# Patient Record
Sex: FEMALE | Race: WHITE | NOT HISPANIC OR LATINO | Employment: FULL TIME
[De-identification: names, ages, dates, MRNs, and addresses within clinical notes are randomized per-mention and may not be internally consistent; named-entity substitution may affect disease eponyms.]

---

## 2017-03-02 ENCOUNTER — RX ONLY (RX ONLY)
Age: 62
End: 2017-03-02

## 2017-03-02 RX ORDER — DOXYCYCLINE 100 MG/1
ADD'L SIG ADD'L SIG TABLET, FILM COATED ORAL ADD'L SIG
Qty: 30 | Refills: 6 | Status: DISCONTINUED
Start: 2017-03-02 | End: 2019-04-01

## 2017-10-03 ENCOUNTER — SKIN CANCER EXAM (OUTPATIENT)
Dept: URBAN - METROPOLITAN AREA CLINIC 31 | Facility: CLINIC | Age: 62
Setting detail: DERMATOLOGY
End: 2017-10-03

## 2017-10-03 PROCEDURE — 11300 SHAVE SKIN LESION 0.5 CM/<: CPT

## 2017-10-03 PROCEDURE — 99214 OFFICE O/P EST MOD 30 MIN: CPT

## 2017-10-24 ENCOUNTER — RX ONLY (RX ONLY)
Age: 62
End: 2017-10-24

## 2017-10-24 RX ORDER — DOXYCYCLINE 100 MG/1
ADD'L SIG ADD'L SIG TABLET, FILM COATED ORAL ADD'L SIG
Qty: 30 | Refills: 6 | Status: DISCONTINUED
Start: 2017-10-24 | End: 2019-04-01

## 2018-06-29 ENCOUNTER — RX ONLY (RX ONLY)
Age: 63
End: 2018-06-29

## 2018-06-29 ENCOUNTER — WORRISOME GROWTH - SEE NOTE (OUTPATIENT)
Dept: URBAN - METROPOLITAN AREA CLINIC 31 | Facility: CLINIC | Age: 63
Setting detail: DERMATOLOGY
End: 2018-06-29

## 2018-06-29 PROCEDURE — 99213 OFFICE O/P EST LOW 20 MIN: CPT

## 2018-06-29 PROCEDURE — 11900 INJECT SKIN LESIONS </W 7: CPT

## 2018-06-29 RX ORDER — CLOBETASOL PROPIONATE 0.5 MG/G
1 APPLICATION CREAM TOPICAL BID
Qty: 60 | Refills: 3 | Status: DISCONTINUED
Start: 2018-06-29 | End: 2019-11-21

## 2018-06-29 RX ORDER — DOXYCYCLINE HYCLATE 200 MG/1
1 TABLET TABLET, DELAYED RELEASE ORAL DAILY
Qty: 10 | Refills: 0 | Status: DISCONTINUED
Start: 2018-06-29 | End: 2018-06-29

## 2018-06-29 RX ORDER — DOXYCYCLINE HYCLATE 100 MG/1
1 CAPSULE CAPSULE, GELATIN COATED ORAL DAILY
Qty: 90 | Refills: 1 | Status: DISCONTINUED
Start: 2018-06-29 | End: 2019-04-01

## 2018-06-29 RX ORDER — DOXYCYCLINE 100 MG/1
1 TABLET TABLET, FILM COATED ORAL BID
Qty: 20 | Refills: 0 | Status: DISCONTINUED
Start: 2018-06-29 | End: 2019-04-01

## 2018-07-02 ENCOUNTER — RX ONLY (RX ONLY)
Age: 63
End: 2018-07-02

## 2018-07-02 RX ORDER — DOXYCYCLINE 100 MG/1
ADD'L SIG ADD'L SIG TABLET, FILM COATED ORAL ADD'L SIG
Qty: 30 | Refills: 6 | Status: DISCONTINUED
Start: 2018-07-02 | End: 2019-04-01

## 2018-10-02 ENCOUNTER — RX ONLY (RX ONLY)
Age: 63
End: 2018-10-02

## 2018-10-02 RX ORDER — DOXYCYCLINE HYCLATE 100 MG/1
ADD'L SIG ADD'L SIG CAPSULE, GELATIN COATED ORAL ADD'L SIG
Qty: 100 | Refills: 1 | Status: DISCONTINUED
Start: 2018-10-02 | End: 2019-11-21

## 2019-01-11 ENCOUNTER — RX ONLY (RX ONLY)
Age: 64
End: 2019-01-11

## 2019-01-11 RX ORDER — CLOBETASOL PROPIONATE 0.5 MG/G
1 APPLICATION CREAM TOPICAL BID
Qty: 30 | Refills: 1 | Status: DISCONTINUED
Start: 2019-01-11 | End: 2019-01-16

## 2019-01-16 ENCOUNTER — RX ONLY (RX ONLY)
Age: 64
End: 2019-01-16

## 2019-01-16 RX ORDER — CLOBETASOL PROPIONATE 0.5 MG/G
1 APPLICATION CREAM TOPICAL BID
Qty: 30 | Refills: 1 | Status: DISCONTINUED
Start: 2019-01-16 | End: 2019-11-21

## 2019-01-31 ENCOUNTER — SEE NOTE (OUTPATIENT)
Dept: URBAN - METROPOLITAN AREA CLINIC 31 | Facility: CLINIC | Age: 64
Setting detail: DERMATOLOGY
End: 2019-01-31

## 2019-01-31 DIAGNOSIS — Z41.9 ENCOUNTER FOR PROCEDURE FOR PURPOSES OTHER THAN REMEDYING HEALTH STATE, UNSPECIFIED: ICD-10-CM

## 2019-01-31 PROBLEM — L57.0 ACTINIC KERATOSIS: Status: RESOLVED | Noted: 2019-01-31

## 2019-01-31 PROCEDURE — 17000 DESTRUCT PREMALG LESION: CPT

## 2019-01-31 PROCEDURE — 99213 OFFICE O/P EST LOW 20 MIN: CPT

## 2019-01-31 PROCEDURE — 17003 DESTRUCT PREMALG LES 2-14: CPT

## 2019-04-01 ENCOUNTER — SKIN CANCER EXAM (OUTPATIENT)
Dept: URBAN - METROPOLITAN AREA CLINIC 31 | Facility: CLINIC | Age: 64
Setting detail: DERMATOLOGY
End: 2019-04-01

## 2019-04-01 DIAGNOSIS — Z41.9 ENCOUNTER FOR PROCEDURE FOR PURPOSES OTHER THAN REMEDYING HEALTH STATE, UNSPECIFIED: ICD-10-CM

## 2019-04-01 PROCEDURE — 99214 OFFICE O/P EST MOD 30 MIN: CPT

## 2019-04-02 ENCOUNTER — RX ONLY (RX ONLY)
Age: 64
End: 2019-04-02

## 2019-04-02 RX ORDER — DOXYCYCLINE HYCLATE 100 MG/1
ADD'L SIG ADD'L SIG CAPSULE, GELATIN COATED ORAL ADD'L SIG
Qty: 90 | Refills: 0 | Status: DISCONTINUED
Start: 2019-04-02 | End: 2019-11-21

## 2019-06-13 ENCOUNTER — WORRISOME GROWTH - SEE NOTE (OUTPATIENT)
Dept: URBAN - METROPOLITAN AREA CLINIC 31 | Facility: CLINIC | Age: 64
Setting detail: DERMATOLOGY
End: 2019-06-13

## 2019-06-13 DIAGNOSIS — L82.0 INFLAMED SEBORRHEIC KERATOSIS: ICD-10-CM

## 2019-06-13 PROBLEM — L57.0 ACTINIC KERATOSIS: Status: RESOLVED | Noted: 2019-06-13

## 2019-06-13 PROCEDURE — 17003 DESTRUCT PREMALG LES 2-14: CPT

## 2019-06-13 PROCEDURE — 17000 DESTRUCT PREMALG LESION: CPT

## 2019-06-13 RX ORDER — EMOLLIENT COMBINATION NO.10
1 APPLICATION EMULSION (GRAM) TOPICAL BID
Qty: 45 | Refills: 6 | Status: DISCONTINUED
Start: 2019-06-13 | End: 2020-06-22

## 2019-11-21 ENCOUNTER — WORRISOME GROWTH - SEE NOTE (OUTPATIENT)
Dept: URBAN - METROPOLITAN AREA CLINIC 31 | Facility: CLINIC | Age: 64
Setting detail: DERMATOLOGY
End: 2019-11-21

## 2019-11-21 DIAGNOSIS — L57.0 ACTINIC KERATOSIS: ICD-10-CM

## 2019-11-21 PROBLEM — L85.3 XEROSIS CUTIS: Status: RESOLVED | Noted: 2019-11-21

## 2019-11-21 PROBLEM — L85.3 XEROSIS CUTIS: Status: ACTIVE | Noted: 2019-11-21

## 2019-11-21 PROCEDURE — 17000 DESTRUCT PREMALG LESION: CPT

## 2019-11-21 PROCEDURE — 99212 OFFICE O/P EST SF 10 MIN: CPT

## 2020-03-03 ENCOUNTER — RX ONLY (RX ONLY)
Age: 65
End: 2020-03-03

## 2020-03-03 RX ORDER — DOXYCYCLINE HYCLATE 100 MG/1
ADD'L SIG ADD'L SIG CAPSULE, GELATIN COATED ORAL ADD'L SIG
Qty: 90 | Refills: 0 | Status: DISCONTINUED
Start: 2020-03-03 | End: 2020-05-29

## 2020-05-29 ENCOUNTER — RX ONLY (RX ONLY)
Age: 65
End: 2020-05-29

## 2020-05-29 RX ORDER — DOXYCYCLINE HYCLATE 100 MG/1
ADD'L SIG ADD'L SIG CAPSULE, GELATIN COATED ORAL ADD'L SIG
Qty: 90 | Refills: 0 | Status: DISCONTINUED
Start: 2020-05-29 | End: 2020-05-29

## 2020-05-29 RX ORDER — CLOBETASOL PROPIONATE 0.5 MG/G
1 APPLICATION CREAM TOPICAL BID
Qty: 30 | Refills: 1 | Status: DISCONTINUED
Start: 2020-05-29 | End: 2020-07-15

## 2020-05-29 RX ORDER — DOXYCYCLINE HYCLATE 100 MG/1
ADD'L SIG ADD'L SIG CAPSULE, GELATIN COATED ORAL ADD'L SIG
Qty: 90 | Refills: 0 | Status: DISCONTINUED
Start: 2020-05-29 | End: 2020-08-04

## 2020-05-29 RX ORDER — CLOBETASOL PROPIONATE 0.5 MG/G
1 APPLICATION CREAM TOPICAL BID
Qty: 30 | Refills: 1 | Status: DISCONTINUED
Start: 2020-05-29 | End: 2020-05-29

## 2020-06-01 ENCOUNTER — RX ONLY (RX ONLY)
Age: 65
End: 2020-06-01

## 2020-06-01 RX ORDER — DOXYCYCLINE HYCLATE 100 MG/1
ADD'L SIG ADD'L SIG CAPSULE, GELATIN COATED ORAL ADD'L SIG
Qty: 90 | Refills: 0
Start: 2020-06-01

## 2020-06-22 ENCOUNTER — RX ONLY (RX ONLY)
Age: 65
End: 2020-06-22

## 2020-06-22 RX ORDER — EMOLLIENT COMBINATION NO.10
1 APPLICATION EMULSION (GRAM) TOPICAL BID
Qty: 45 | Refills: 6
Start: 2020-06-22

## 2020-07-15 ENCOUNTER — RX ONLY (RX ONLY)
Age: 65
End: 2020-07-15

## 2020-07-15 ENCOUNTER — OTHER - (OUTPATIENT)
Dept: URBAN - METROPOLITAN AREA CLINIC 32 | Facility: CLINIC | Age: 65
Setting detail: DERMATOLOGY
End: 2020-07-15

## 2020-07-15 DIAGNOSIS — D22.5 MELANOCYTIC NEVI OF TRUNK: ICD-10-CM

## 2020-07-15 PROCEDURE — 99213 OFFICE O/P EST LOW 20 MIN: CPT

## 2020-07-15 RX ORDER — CLOBETASOL PROPIONATE 0.5 MG/G
1 APPLICATION CREAM TOPICAL BID
Qty: 30 | Refills: 1
Start: 2020-07-15

## 2020-07-15 RX ORDER — FAMCICLOVIR 500 MG/1
1 TABLET TABLET, FILM COATED ORAL TID
Qty: 21 | Refills: 0 | Status: DISCONTINUED
Start: 2020-07-15 | End: 2020-07-17

## 2020-07-15 RX ORDER — DESONIDE 0.5 MG/G
1 APPLICATION CREAM TOPICAL BID
Qty: 60 | Refills: 0
Start: 2020-07-15

## 2020-07-17 ENCOUNTER — RX ONLY (RX ONLY)
Age: 65
End: 2020-07-17

## 2020-07-17 RX ORDER — FAMCICLOVIR 500 MG/1
1 TABLET TABLET, FILM COATED ORAL TID
Qty: 21 | Refills: 0
Start: 2020-07-17

## 2020-08-04 ENCOUNTER — RX ONLY (RX ONLY)
Age: 65
End: 2020-08-04

## 2020-08-04 RX ORDER — DOXYCYCLINE HYCLATE 100 MG/1
ADD'L SIG CAPSULE CAPSULE, GELATIN COATED ORAL ADD'L SIG
Qty: 90 | Refills: 0 | Status: DISCONTINUED
Start: 2020-08-04 | End: 2020-11-03

## 2020-08-18 ENCOUNTER — SKIN CANCER EXAM (OUTPATIENT)
Dept: URBAN - METROPOLITAN AREA CLINIC 31 | Facility: CLINIC | Age: 65
Setting detail: DERMATOLOGY
End: 2020-08-18

## 2020-08-18 DIAGNOSIS — C44.612 BASAL CELL CARCINOMA OF SKIN OF RIGHT UPPER LIMB, INCLUDING SHOULDER: ICD-10-CM

## 2020-08-18 PROBLEM — L82.1 OTHER SEBORRHEIC KERATOSIS: Status: RESOLVED | Noted: 2020-08-18

## 2020-08-18 PROBLEM — L82.1 OTHER SEBORRHEIC KERATOSIS: Status: ACTIVE | Noted: 2020-08-18

## 2020-08-18 PROCEDURE — 99214 OFFICE O/P EST MOD 30 MIN: CPT

## 2020-10-23 ENCOUNTER — RX ONLY (RX ONLY)
Age: 65
End: 2020-10-23

## 2020-10-23 ENCOUNTER — TELEHEALTH (OUTPATIENT)
Dept: URBAN - METROPOLITAN AREA CLINIC 36 | Facility: CLINIC | Age: 65
Setting detail: DERMATOLOGY
End: 2020-10-23

## 2020-10-23 DIAGNOSIS — Z41.9 ENCOUNTER FOR PROCEDURE FOR PURPOSES OTHER THAN REMEDYING HEALTH STATE, UNSPECIFIED: ICD-10-CM

## 2020-10-23 PROCEDURE — 99213 OFFICE O/P EST LOW 20 MIN: CPT

## 2020-10-23 RX ORDER — CLINDAMYCIN PHOSPHATE AND BENZOYL PEROXIDE 10; 50 MG/G; MG/G
1 APPLICATION GEL TOPICAL BID
Qty: 45 | Refills: 0
Start: 2020-10-23

## 2020-11-03 ENCOUNTER — RX ONLY (RX ONLY)
Age: 65
End: 2020-11-03

## 2020-11-03 RX ORDER — DOXYCYCLINE HYCLATE 100 MG/1
ADD'L SIG CAPSULE CAPSULE, GELATIN COATED ORAL ADD'L SIG
Qty: 90 | Refills: 0
Start: 2020-11-03

## 2021-02-01 ENCOUNTER — TELEHEALTH (OUTPATIENT)
Dept: URBAN - METROPOLITAN AREA CLINIC 31 | Facility: CLINIC | Age: 66
Setting detail: DERMATOLOGY
End: 2021-02-01

## 2021-02-01 ENCOUNTER — RX ONLY (RX ONLY)
Age: 66
End: 2021-02-01

## 2021-02-01 DIAGNOSIS — L82.0 INFLAMED SEBORRHEIC KERATOSIS: ICD-10-CM

## 2021-02-01 PROBLEM — L60.9 NAIL DISORDER, UNSPECIFIED: Status: ACTIVE | Noted: 2021-02-01

## 2021-02-01 PROBLEM — L60.9 NAIL DISORDER, UNSPECIFIED: Status: RESOLVED | Noted: 2021-02-01

## 2021-02-01 PROCEDURE — 99213 OFFICE O/P EST LOW 20 MIN: CPT

## 2021-02-01 RX ORDER — CICLOPIROX 80 MG/ML
1 ML SOLUTION TOPICAL BID
Qty: 6.6 | Refills: 2
Start: 2021-02-01

## 2021-02-02 PROBLEM — L603 703.8: Status: ACTIVE | Noted: 2021-02-02

## 2021-02-02 PROBLEM — L604 703.8: Status: ACTIVE | Noted: 2021-02-02

## 2021-02-02 PROBLEM — L602 703.8: Status: ACTIVE | Noted: 2021-02-02

## 2021-02-02 PROBLEM — L608 703.8: Status: ACTIVE | Noted: 2021-02-02

## 2021-02-02 PROBLEM — L601 703.8: Status: ACTIVE | Noted: 2021-02-02

## 2021-05-05 ENCOUNTER — RX ONLY (RX ONLY)
Age: 66
End: 2021-05-05

## 2021-05-05 RX ORDER — DOXYCYCLINE 100 MG/1
1 TABLET TABLET, FILM COATED ORAL ONCE A DAY
Qty: 90 | Refills: 1
Start: 2021-05-05

## 2021-06-22 ENCOUNTER — RX ONLY (RX ONLY)
Age: 66
End: 2021-06-22

## 2021-06-22 ENCOUNTER — RASH (OUTPATIENT)
Dept: URBAN - METROPOLITAN AREA CLINIC 31 | Facility: CLINIC | Age: 66
Setting detail: DERMATOLOGY
End: 2021-06-22

## 2021-06-22 DIAGNOSIS — D22.5 MELANOCYTIC NEVI OF TRUNK: ICD-10-CM

## 2021-06-22 PROBLEM — R21 RASH AND OTHER NONSPECIFIC SKIN ERUPTION: Status: ACTIVE | Noted: 2021-06-22

## 2021-06-22 PROBLEM — L85.3 XEROSIS CUTIS: Status: ACTIVE | Noted: 2021-06-22

## 2021-06-22 PROBLEM — L85.3 XEROSIS CUTIS: Status: RESOLVED | Noted: 2021-06-22

## 2021-06-22 PROBLEM — R21 RASH AND OTHER NONSPECIFIC SKIN ERUPTION: Status: RESOLVED | Noted: 2021-06-22

## 2021-06-22 PROCEDURE — 99213 OFFICE O/P EST LOW 20 MIN: CPT

## 2021-06-22 RX ORDER — TRIAMCINOLONE ACETONIDE 1 MG/G
1 A SMALL AMOUNT CREAM TOPICAL TWICE A DAY
Qty: 454 | Refills: 0
Start: 2021-06-22

## 2021-06-22 RX ORDER — CLOBETASOL PROPIONATE 0.5 MG/G
1 A SMALL AMOUNT CREAM TOPICAL
Qty: 15 | Refills: 1
Start: 2021-06-22

## 2021-06-22 RX ORDER — DOXYCYCLINE 100 MG/1
1 TABLET TABLET, FILM COATED ORAL ONCE A DAY
Qty: 90 | Refills: 1
Start: 2021-06-22

## 2021-06-22 RX ORDER — DOXYCYCLINE HYCLATE 100 MG/1
1 CAPSULE CAPSULE, GELATIN COATED ORAL ONCE A DAY
Qty: 90 | Refills: 3
Start: 2021-06-22

## 2021-06-23 ENCOUNTER — RX ONLY (RX ONLY)
Age: 66
End: 2021-06-23

## 2021-06-23 RX ORDER — CLOBETASOL PROPIONATE 0.5 MG/G
1 A SMALL AMOUNT CREAM TOPICAL
Qty: 60 | Refills: 1
Start: 2021-06-23

## 2021-10-28 ENCOUNTER — SKIN CANCER EXAM (OUTPATIENT)
Dept: URBAN - METROPOLITAN AREA CLINIC 31 | Facility: CLINIC | Age: 66
Setting detail: DERMATOLOGY
End: 2021-10-28

## 2021-10-28 DIAGNOSIS — L57.0 ACTINIC KERATOSIS: ICD-10-CM

## 2021-10-28 PROBLEM — L82.1 OTHER SEBORRHEIC KERATOSIS: Status: ACTIVE | Noted: 2021-10-28

## 2021-10-28 PROBLEM — L82.1 OTHER SEBORRHEIC KERATOSIS: Status: RESOLVED | Noted: 2021-10-28

## 2021-10-28 PROCEDURE — 99213 OFFICE O/P EST LOW 20 MIN: CPT

## 2022-02-22 ENCOUNTER — TELEPHONE ENCOUNTER (OUTPATIENT)
Dept: URBAN - METROPOLITAN AREA CLINIC 96 | Facility: CLINIC | Age: 67
End: 2022-02-22

## 2022-04-21 ENCOUNTER — RX ONLY (RX ONLY)
Age: 67
End: 2022-04-21

## 2022-04-21 RX ORDER — DOXYCYCLINE HYCLATE 100 MG/1
1 CAPSULE CAPSULE, GELATIN COATED ORAL ONCE A DAY
Qty: 90 | Refills: 2
Start: 2022-04-21

## 2022-06-29 ENCOUNTER — TELEPHONE ENCOUNTER (OUTPATIENT)
Dept: URBAN - METROPOLITAN AREA CLINIC 23 | Facility: CLINIC | Age: 67
End: 2022-06-29

## 2022-12-05 ENCOUNTER — APPOINTMENT (OUTPATIENT)
Dept: URBAN - METROPOLITAN AREA CLINIC 206 | Age: 67
Setting detail: DERMATOLOGY
End: 2022-12-05

## 2022-12-05 DIAGNOSIS — L81.1 CHLOASMA: ICD-10-CM

## 2022-12-05 PROCEDURE — OTHER COUNSELING: OTHER

## 2022-12-05 PROCEDURE — OTHER PRESCRIPTION MEDICATION MANAGEMENT: OTHER

## 2022-12-05 PROCEDURE — 99214 OFFICE O/P EST MOD 30 MIN: CPT

## 2022-12-05 PROCEDURE — OTHER OTHER: OTHER

## 2022-12-05 ASSESSMENT — LOCATION ZONE DERM: LOCATION ZONE: FACE

## 2022-12-05 ASSESSMENT — LOCATION DETAILED DESCRIPTION DERM
LOCATION DETAILED: LEFT MEDIAL FOREHEAD
LOCATION DETAILED: LEFT INFERIOR CENTRAL MALAR CHEEK
LOCATION DETAILED: RIGHT INFERIOR CENTRAL MALAR CHEEK
LOCATION DETAILED: LEFT LATERAL FOREHEAD
LOCATION DETAILED: RIGHT FOREHEAD

## 2022-12-05 ASSESSMENT — LOCATION SIMPLE DESCRIPTION DERM
LOCATION SIMPLE: LEFT CHEEK
LOCATION SIMPLE: LEFT FOREHEAD
LOCATION SIMPLE: RIGHT CHEEK
LOCATION SIMPLE: RIGHT FOREHEAD

## 2022-12-05 NOTE — HPI: DISCOLORATION (HYPERPIGMENTATION)
Is This A New Presentation, Or A Follow-Up?: Follow Up Hyperpigmentation
How Severe Is It?: moderate
Additional History: Pt in need of Hydroquinone pads from our office, would like the strongest available. Pt wants to know if Cerave lite lotion 35+ is a good moisturizer

## 2022-12-05 NOTE — PROCEDURE: PRESCRIPTION MEDICATION MANAGEMENT
Continue Regimen: -CeraVe lite lotion every day\\n-Hydroquinone 6% pads use once daily at night time for 2 months then take a break for a month before resuming. If you want to do compound medication please call us and we will send compound to a compound either in [place] or to [facility] and you can have someone ship it to your house. They do not ship out of state Continue Regimen: -CeraVe lite lotion every day\\n-Hydroquinone 6% pads use once daily at night time for 2 months then take a break for a month before resuming. If you want to do compound medication please call us and we will send compound to a compound either in [place] or to [facility] and you can have someone ship it to your house. They do not ship out of state

## 2022-12-05 NOTE — PROCEDURE: PRESCRIPTION MEDICATION MANAGEMENT
Plan: -we recommend Elta MD daily we sell in the office. Advised that mineral sunscreen is advised, but pt stated she cannot tolerate it. Pt advised to try Elta MD Daily in office as it has a similar consistency to the Cerave.\\n-discussed increasing hydroquinone prescription instead of the pads but you opted for the pads

## 2022-12-05 NOTE — PROCEDURE: OTHER
Other (Free Text): Pt wants to do HQ 6% pads first if no improvement then she will have us send TMC/HQ/Tretinoin to [facility]
Note Text (......Xxx Chief Complaint.): This diagnosis correlates with the
Render Risk Assessment In Note?: no
Detail Level: Zone